# Patient Record
Sex: MALE | URBAN - METROPOLITAN AREA
[De-identification: names, ages, dates, MRNs, and addresses within clinical notes are randomized per-mention and may not be internally consistent; named-entity substitution may affect disease eponyms.]

---

## 2023-06-22 ENCOUNTER — APPOINTMENT (OUTPATIENT)
Dept: URBAN - METROPOLITAN AREA SURGERY 9 | Age: 23
Setting detail: DERMATOLOGY
End: 2023-06-23

## 2023-06-22 DIAGNOSIS — L64.8 OTHER ANDROGENIC ALOPECIA: ICD-10-CM

## 2023-06-22 DIAGNOSIS — L74.510 PRIMARY FOCAL HYPERHIDROSIS, AXILLA: ICD-10-CM

## 2023-06-22 PROCEDURE — OTHER PRESCRIPTION: OTHER

## 2023-06-22 PROCEDURE — OTHER MIPS QUALITY: OTHER

## 2023-06-22 PROCEDURE — 99203 OFFICE O/P NEW LOW 30 MIN: CPT

## 2023-06-22 PROCEDURE — OTHER ADDITIONAL NOTES: OTHER

## 2023-06-22 PROCEDURE — OTHER TREATMENT REGIMEN: OTHER

## 2023-06-22 PROCEDURE — OTHER COUNSELING: OTHER

## 2023-06-22 RX ORDER — GLYCOPYRROLATE 1 MG/1
TABLET ORAL
Qty: 30 | Refills: 11 | Status: ERX | COMMUNITY
Start: 2023-06-22

## 2023-06-22 ASSESSMENT — LOCATION DETAILED DESCRIPTION DERM
LOCATION DETAILED: RIGHT AXILLARY VAULT
LOCATION DETAILED: LEFT AXILLARY VAULT

## 2023-06-22 ASSESSMENT — LOCATION SIMPLE DESCRIPTION DERM
LOCATION SIMPLE: RIGHT AXILLARY VAULT
LOCATION SIMPLE: LEFT AXILLARY VAULT

## 2023-06-22 ASSESSMENT — LOCATION ZONE DERM: LOCATION ZONE: AXILLAE

## 2023-06-22 NOTE — HPI: SWEATING (HYPERHIDROSIS)
Is This A New Presentation, Or A Follow-Up?: Sweating
Sweating Severity Scale: 3- The sweating is barely tolerable and frequently interferes with daily activities
Additional History: He previously tried Drysol with minimal improvement and had difficulty using due to irritation and as he felt he wasn't instructed on the best way to use it. He also tried Qbrexa but had to stop as was no longer covered by insurance.

## 2023-06-22 NOTE — PROCEDURE: ADDITIONAL NOTES
Render Risk Assessment In Note?: no
Additional Notes: previously failed Drysol. Qbrexa cost-prohibitive when last tried to fill. Discussed glycopyrrolate vs Botox vs Miradry. he elected for trial of glycopyrrolate
Detail Level: Simple

## 2023-06-22 NOTE — HPI: HAIR LOSS
Additional History: Using compounded minoxidil, finasteride, tretinoin from previous derm office. Not interested in oral medication due to potential side effects

## 2023-06-22 NOTE — PROCEDURE: TREATMENT REGIMEN
Detail Level: Detailed
Plan: Glycopyrrolate 1 mg daily\\nIf unable to control or tolerate due to side effects, can retry Drysol or attempt to get Qbrexa covered
Plan: Continue current compounded topical minoxidil, finasteride, tretinoin. When prescription runs out, can consider alternative more potent topical from [facility]

## 2024-06-27 ENCOUNTER — APPOINTMENT (OUTPATIENT)
Dept: URBAN - METROPOLITAN AREA SURGERY 9 | Age: 24
Setting detail: DERMATOLOGY
End: 2024-06-28

## 2024-06-27 DIAGNOSIS — L64.8 OTHER ANDROGENIC ALOPECIA: ICD-10-CM

## 2024-06-27 DIAGNOSIS — L70.0 ACNE VULGARIS: ICD-10-CM

## 2024-06-27 DIAGNOSIS — L74.510 PRIMARY FOCAL HYPERHIDROSIS, AXILLA: ICD-10-CM

## 2024-06-27 PROCEDURE — OTHER COUNSELING: OTHER

## 2024-06-27 PROCEDURE — OTHER TREATMENT REGIMEN: OTHER

## 2024-06-27 PROCEDURE — OTHER PRESCRIPTION MEDICATION MANAGEMENT: OTHER

## 2024-06-27 PROCEDURE — 99214 OFFICE O/P EST MOD 30 MIN: CPT

## 2024-06-27 PROCEDURE — OTHER MIPS QUALITY: OTHER

## 2024-06-27 ASSESSMENT — LOCATION SIMPLE DESCRIPTION DERM
LOCATION SIMPLE: UPPER BACK
LOCATION SIMPLE: RIGHT FOREHEAD
LOCATION SIMPLE: RIGHT AXILLARY VAULT
LOCATION SIMPLE: LEFT AXILLARY VAULT

## 2024-06-27 ASSESSMENT — LOCATION ZONE DERM
LOCATION ZONE: FACE
LOCATION ZONE: AXILLAE
LOCATION ZONE: TRUNK

## 2024-06-27 ASSESSMENT — LOCATION DETAILED DESCRIPTION DERM
LOCATION DETAILED: SUPERIOR THORACIC SPINE
LOCATION DETAILED: LEFT AXILLARY VAULT
LOCATION DETAILED: RIGHT AXILLARY VAULT
LOCATION DETAILED: RIGHT SUPERIOR MEDIAL FOREHEAD

## 2024-06-27 NOTE — PROCEDURE: TREATMENT REGIMEN
Detail Level: Detailed
Plan: Previously failed Drysol, Qbrexa, and glycoprryolate  Discussed Botox vs MiraDry. He is more interested in MiraDry at this time and demonstrated how to search [email or web address] website to find office in town to schedule consultation\\n\\nIf elects not to pursue MiraDry, contact office and can submit PA for Botox
Plan: Can continue current compounded topical minoxidil, finasteride, tretinoin as he has refills and can get 3 bottles for $120 wheres as similar product from [facility] is approximately $70 for 1 bottle

## 2024-06-27 NOTE — HPI: ACNE (PATIENT REPORTED)
Where Is Your Acne Located?: Face, back
Additional Comments (Use Complete Sentences): Flaring on forehead as he ran out of topical from previous derm in [place]

## 2024-06-27 NOTE — PROCEDURE: PRESCRIPTION MEDICATION MANAGEMENT
Detail Level: Simple
Render In Strict Bullet Format?: No
Plan: Previously well controlled with compounded adapalene/clindamycin/BPO gel and will restart equivalent from [facility]. Samples of Cabtreo provided to start while await prescription.\\n\\nDiscussed restarting dapsone but deferred for now as hopeful can control with adapelene/BPO/clinda gel. If not improved, contact office and can restart topical dapsone either via [facility] or try to get covered by insurance.